# Patient Record
Sex: FEMALE | Race: BLACK OR AFRICAN AMERICAN | Employment: UNEMPLOYED | URBAN - METROPOLITAN AREA
[De-identification: names, ages, dates, MRNs, and addresses within clinical notes are randomized per-mention and may not be internally consistent; named-entity substitution may affect disease eponyms.]

---

## 2018-07-06 PROCEDURE — 87624 HPV HI-RISK TYP POOLED RSLT: CPT | Performed by: OBSTETRICS & GYNECOLOGY

## 2018-07-06 PROCEDURE — 88175 CYTOPATH C/V AUTO FLUID REDO: CPT | Performed by: OBSTETRICS & GYNECOLOGY

## 2021-04-01 ENCOUNTER — APPOINTMENT (OUTPATIENT)
Dept: GENERAL RADIOLOGY | Facility: HOSPITAL | Age: 41
End: 2021-04-01
Attending: EMERGENCY MEDICINE
Payer: MEDICAID

## 2021-04-01 ENCOUNTER — HOSPITAL ENCOUNTER (EMERGENCY)
Facility: HOSPITAL | Age: 41
Discharge: HOME | End: 2021-04-02
Attending: EMERGENCY MEDICINE
Payer: MEDICAID

## 2021-04-01 VITALS
OXYGEN SATURATION: 100 % | BODY MASS INDEX: 24.25 KG/M2 | HEART RATE: 94 BPM | TEMPERATURE: 98 F | DIASTOLIC BLOOD PRESSURE: 79 MMHG | RESPIRATION RATE: 18 BRPM | HEIGHT: 68 IN | WEIGHT: 160 LBS | SYSTOLIC BLOOD PRESSURE: 123 MMHG

## 2021-04-01 DIAGNOSIS — M77.41 METATARSALGIA OF RIGHT FOOT: Primary | ICD-10-CM

## 2021-04-01 PROCEDURE — 99283 EMERGENCY DEPT VISIT LOW MDM: CPT

## 2021-04-01 PROCEDURE — 73630 X-RAY EXAM OF FOOT: CPT | Performed by: EMERGENCY MEDICINE

## 2021-04-01 RX ORDER — IBUPROFEN 600 MG/1
600 TABLET ORAL ONCE
Status: COMPLETED | OUTPATIENT
Start: 2021-04-01 | End: 2021-04-01

## 2021-04-02 NOTE — ED PROVIDER NOTES
Patient Seen in: [facility] Emergency Department      History   Patient presents with:  Pain    Stated Complaint: foot pain after wearing high heels    HPI/Subjective:   HPI    37-year-old female complaint of right foot pain the patient states that swelling or deformity neurovascular is fully intact. ED Course   Labs Reviewed - No data to display       XR FOOT, COMPLETE (MIN 3 VIEWS), RIGHT (CPT=73630)    Result Date: 4/1/2021  CONCLUSION:  Negative.    Dictated by (CST): Charbel Candelaria MD on 4

## 2021-04-02 NOTE — ED INITIAL ASSESSMENT (HPI)
Pt was wearing high heels one week ago and now the sole of the foot and the top of the foot hurt. Some relief with massage but then pain returns when she walks.

## 2022-12-31 ENCOUNTER — HOSPITAL ENCOUNTER (EMERGENCY)
Facility: HOSPITAL | Age: 42
Discharge: HOME | End: 2022-12-31
Attending: EMERGENCY MEDICINE
Payer: MEDICAID

## 2022-12-31 ENCOUNTER — APPOINTMENT (OUTPATIENT)
Dept: CT IMAGING | Facility: HOSPITAL | Age: 42
End: 2022-12-31
Attending: EMERGENCY MEDICINE
Payer: MEDICAID

## 2022-12-31 VITALS
TEMPERATURE: 100 F | BODY MASS INDEX: 23.54 KG/M2 | RESPIRATION RATE: 16 BRPM | HEIGHT: 67 IN | SYSTOLIC BLOOD PRESSURE: 142 MMHG | HEART RATE: 72 BPM | DIASTOLIC BLOOD PRESSURE: 78 MMHG | OXYGEN SATURATION: 100 % | WEIGHT: 150 LBS

## 2022-12-31 DIAGNOSIS — R51.9 ACUTE NONINTRACTABLE HEADACHE, UNSPECIFIED HEADACHE TYPE: Primary | ICD-10-CM

## 2022-12-31 DIAGNOSIS — B34.9 VIRAL SYNDROME: ICD-10-CM

## 2022-12-31 LAB
ALBUMIN SERPL-MCNC: 3.9 G/DL (ref 3.4–5)
ALBUMIN/GLOB SERPL: 0.8 {RATIO} (ref 1–2)
ALP LIVER SERPL-CCNC: 46 U/L
ALT SERPL-CCNC: 13 U/L
ANION GAP SERPL CALC-SCNC: 5 MMOL/L (ref 0–18)
AST SERPL-CCNC: 9 U/L (ref 15–37)
B-HCG UR QL: NEGATIVE
BASOPHILS # BLD AUTO: 0.01 X10(3) UL (ref 0–0.2)
BASOPHILS NFR BLD AUTO: 0.1 %
BILIRUB SERPL-MCNC: 0.6 MG/DL (ref 0.1–2)
BILIRUB UR QL STRIP.AUTO: NEGATIVE
BUN BLD-MCNC: 6 MG/DL (ref 7–18)
CALCIUM BLD-MCNC: 9.3 MG/DL (ref 8.5–10.1)
CHLORIDE SERPL-SCNC: 104 MMOL/L (ref 98–112)
CLARITY UR REFRACT.AUTO: CLEAR
CO2 SERPL-SCNC: 25 MMOL/L (ref 21–32)
COLOR UR AUTO: YELLOW
CREAT BLD-MCNC: 0.78 MG/DL
EOSINOPHIL # BLD AUTO: 0 X10(3) UL (ref 0–0.7)
EOSINOPHIL NFR BLD AUTO: 0 %
ERYTHROCYTE [DISTWIDTH] IN BLOOD BY AUTOMATED COUNT: 13.3 %
FLUAV + FLUBV RNA SPEC NAA+PROBE: NEGATIVE
FLUAV + FLUBV RNA SPEC NAA+PROBE: NEGATIVE
GFR SERPLBLD BASED ON 1.73 SQ M-ARVRAT: 98 ML/MIN/1.73M2 (ref 60–?)
GLOBULIN PLAS-MCNC: 4.8 G/DL (ref 2.8–4.4)
GLUCOSE BLD-MCNC: 142 MG/DL (ref 70–99)
GLUCOSE UR STRIP.AUTO-MCNC: NEGATIVE MG/DL
HCT VFR BLD AUTO: 35.2 %
HGB BLD-MCNC: 12 G/DL
IMM GRANULOCYTES # BLD AUTO: 0.01 X10(3) UL (ref 0–1)
IMM GRANULOCYTES NFR BLD: 0.1 %
KETONES UR STRIP.AUTO-MCNC: 20 MG/DL
LEUKOCYTE ESTERASE UR QL STRIP.AUTO: NEGATIVE
LIPASE SERPL-CCNC: 76 U/L (ref 73–393)
LYMPHOCYTES # BLD AUTO: 0.57 X10(3) UL (ref 1–4)
LYMPHOCYTES NFR BLD AUTO: 7.8 %
MCH RBC QN AUTO: 28.8 PG (ref 26–34)
MCHC RBC AUTO-ENTMCNC: 34.1 G/DL (ref 31–37)
MCV RBC AUTO: 84.4 FL
MONOCYTES # BLD AUTO: 0.43 X10(3) UL (ref 0.1–1)
MONOCYTES NFR BLD AUTO: 5.9 %
NEUTROPHILS # BLD AUTO: 6.3 X10 (3) UL (ref 1.5–7.7)
NEUTROPHILS # BLD AUTO: 6.3 X10(3) UL (ref 1.5–7.7)
NEUTROPHILS NFR BLD AUTO: 86.1 %
NITRITE UR QL STRIP.AUTO: NEGATIVE
OSMOLALITY SERPL CALC.SUM OF ELEC: 278 MOSM/KG (ref 275–295)
PH UR STRIP.AUTO: 5 [PH] (ref 5–8)
PLATELET # BLD AUTO: 211 10(3)UL (ref 150–450)
POTASSIUM SERPL-SCNC: 3.7 MMOL/L (ref 3.5–5.1)
PROT SERPL-MCNC: 8.7 G/DL (ref 6.4–8.2)
PROT UR STRIP.AUTO-MCNC: 30 MG/DL
RBC # BLD AUTO: 4.17 X10(6)UL
RBC UR QL AUTO: NEGATIVE
RSV RNA SPEC NAA+PROBE: NEGATIVE
SARS-COV-2 RNA RESP QL NAA+PROBE: NOT DETECTED
SODIUM SERPL-SCNC: 134 MMOL/L (ref 136–145)
SP GR UR STRIP.AUTO: 1.01 (ref 1–1.03)
UROBILINOGEN UR STRIP.AUTO-MCNC: <2 MG/DL
WBC # BLD AUTO: 7.3 X10(3) UL (ref 4–11)

## 2022-12-31 PROCEDURE — 99284 EMERGENCY DEPT VISIT MOD MDM: CPT

## 2022-12-31 PROCEDURE — 70450 CT HEAD/BRAIN W/O DYE: CPT | Performed by: EMERGENCY MEDICINE

## 2022-12-31 PROCEDURE — 96374 THER/PROPH/DIAG INJ IV PUSH: CPT

## 2022-12-31 PROCEDURE — 81025 URINE PREGNANCY TEST: CPT

## 2022-12-31 PROCEDURE — 83690 ASSAY OF LIPASE: CPT

## 2022-12-31 PROCEDURE — 0241U SARS-COV-2/FLU A AND B/RSV BY PCR (GENEXPERT): CPT | Performed by: EMERGENCY MEDICINE

## 2022-12-31 PROCEDURE — 81001 URINALYSIS AUTO W/SCOPE: CPT

## 2022-12-31 PROCEDURE — 0241U SARS-COV-2/FLU A AND B/RSV BY PCR (GENEXPERT): CPT

## 2022-12-31 PROCEDURE — 80053 COMPREHEN METABOLIC PANEL: CPT

## 2022-12-31 PROCEDURE — 96361 HYDRATE IV INFUSION ADD-ON: CPT

## 2022-12-31 PROCEDURE — 85025 COMPLETE CBC W/AUTO DIFF WBC: CPT

## 2022-12-31 PROCEDURE — 96375 TX/PRO/DX INJ NEW DRUG ADDON: CPT

## 2022-12-31 RX ORDER — FLUTICASONE PROPIONATE 50 MCG
2 SPRAY, SUSPENSION (ML) NASAL DAILY
Qty: 16 G | Refills: 0 | Status: SHIPPED | OUTPATIENT
Start: 2022-12-31 | End: 2023-01-30

## 2022-12-31 RX ORDER — SODIUM CHLORIDE 9 MG/ML
INJECTION, SOLUTION INTRAVENOUS CONTINUOUS
Status: DISCONTINUED | OUTPATIENT
Start: 2022-12-31 | End: 2022-12-31

## 2022-12-31 RX ORDER — DIPHENHYDRAMINE HYDROCHLORIDE 50 MG/ML
25 INJECTION INTRAMUSCULAR; INTRAVENOUS ONCE
Status: COMPLETED | OUTPATIENT
Start: 2022-12-31 | End: 2022-12-31

## 2022-12-31 RX ORDER — ONDANSETRON 4 MG/1
4 TABLET, ORALLY DISINTEGRATING ORAL EVERY 4 HOURS PRN
Qty: 10 TABLET | Refills: 0 | Status: SHIPPED | OUTPATIENT
Start: 2022-12-31 | End: 2023-01-07

## 2022-12-31 RX ORDER — METOCLOPRAMIDE HYDROCHLORIDE 5 MG/ML
5 INJECTION INTRAMUSCULAR; INTRAVENOUS ONCE
Status: COMPLETED | OUTPATIENT
Start: 2022-12-31 | End: 2022-12-31

## 2022-12-31 RX ORDER — ONDANSETRON 4 MG/1
4 TABLET, ORALLY DISINTEGRATING ORAL EVERY 4 HOURS PRN
Qty: 10 TABLET | Refills: 0 | Status: SHIPPED | OUTPATIENT
Start: 2022-12-31 | End: 2022-12-31

## 2022-12-31 RX ORDER — KETOROLAC TROMETHAMINE 15 MG/ML
15 INJECTION, SOLUTION INTRAMUSCULAR; INTRAVENOUS ONCE
Status: COMPLETED | OUTPATIENT
Start: 2022-12-31 | End: 2022-12-31

## 2022-12-31 NOTE — ED INITIAL ASSESSMENT (HPI)
Patient arrives to the ED with c/o Abd pain, body aches, headaches, pressure/ sharp pain in the head. Symptoms started after she drank from a \"dirty water bottle\". Patient also had episodes of vomiting.

## 2023-01-01 NOTE — DISCHARGE INSTRUCTIONS
Return for new or worsening symptoms such as increased headache, further vomiting, fever, weakness. Without a spinal tap we cannot rule out meningitis which is a potentially serious infection. If you change your mind about further testing you can return to the ER at any time. You can take acetaminophen 500 mg and ibuprofen 200 mg together every 4-6 hours for pain as needed.

## 2024-03-25 ENCOUNTER — OFFICE VISIT (OUTPATIENT)
Dept: INTERNAL MEDICINE CLINIC | Facility: CLINIC | Age: 44
End: 2024-03-25
Payer: COMMERCIAL

## 2024-03-25 VITALS
BODY MASS INDEX: 24.72 KG/M2 | HEART RATE: 105 BPM | HEIGHT: 68.5 IN | SYSTOLIC BLOOD PRESSURE: 100 MMHG | DIASTOLIC BLOOD PRESSURE: 62 MMHG | OXYGEN SATURATION: 99 % | WEIGHT: 165 LBS | TEMPERATURE: 98 F

## 2024-03-25 DIAGNOSIS — M79.671 FOOT PAIN, BILATERAL: ICD-10-CM

## 2024-03-25 DIAGNOSIS — M79.672 FOOT PAIN, BILATERAL: ICD-10-CM

## 2024-03-25 DIAGNOSIS — M72.2 PLANTAR FASCIITIS, BILATERAL: ICD-10-CM

## 2024-03-25 DIAGNOSIS — Z12.4 PAP SMEAR FOR CERVICAL CANCER SCREENING: ICD-10-CM

## 2024-03-25 DIAGNOSIS — Z76.89 ESTABLISHING CARE WITH NEW DOCTOR, ENCOUNTER FOR: Primary | ICD-10-CM

## 2024-03-25 DIAGNOSIS — Z12.31 SCREENING MAMMOGRAM FOR BREAST CANCER: ICD-10-CM

## 2024-03-25 NOTE — PROGRESS NOTES
[facility]  New Patient History and Physical      HPI:     Chief Complaint   Patient presents with    Establish [facility]       Jaylen Mccall is a 43 year old female presenting for:  Establishment of care.  Has  has a past medical history of Sickle cell trait (HCC).      Here to establish care.  Reports hx of sickle cell trait.  Works as a .      Reports bilateral foot pain.  Has it frequently.              Labs:   Complete Metabolic Panel:  Lab Results   Component Value Date/Time     (L) 12/31/2022 07:12 PM    K 3.7 12/31/2022 07:12 PM     12/31/2022 07:12 PM    CO2 25.0 12/31/2022 07:12 PM    CREATSERUM 0.78 12/31/2022 07:12 PM    CA 9.3 12/31/2022 07:12 PM     (H) 12/31/2022 07:12 PM    TP 8.7 (H) 12/31/2022 07:12 PM    ALB 3.9 12/31/2022 07:12 PM    ALKPHO 46 12/31/2022 07:12 PM    AST 9 (L) 12/31/2022 07:12 PM    ALT 13 12/31/2022 07:12 PM    BILT 0.6 12/31/2022 07:12 PM        CBC:  @LABRCNTIP(RBC:3,HGB:3,HCT:3,MCV:3,MCH:3,MCHC:3,RDW:3,NEPRELIM:3,WBC:3,PLT:3)@       Hemoglobin A1C, Microalbumin  No results found for: \"A1C\"     Lipid panel  No results found for: \"CHOLEST\", \"HDL\", \"TRIG\", \"LDL\", \"NONHDLC\"  The ASCVD Risk score (Marly DK, et al., 2019) failed to calculate for the following reasons:    Cannot find a previous HDL lab    Cannot find a previous total cholesterol lab       Medications:  Current Outpatient Medications   Medication Sig Dispense Refill    Multiple Vitamin (MULTI-VITAMIN DAILY) Oral Tab Take by mouth.        PMH:  Past Medical History:   Diagnosis Date    Sickle cell anemia (HCC)          PSH:  Past Surgical History:   Procedure Laterality Date    LEEP  2005       Allergies:  Allergies   Allergen Reactions    Sulfamethoxazole W/Trimethoprim RASH      Social History:  Social History     Socioeconomic History    Marital status: Single   Tobacco Use    Smoking status: Former     Types: Cigarettes     Quit date: 2003     Years since  quittin.2    Smokeless tobacco: Never   Vaping Use    Vaping Use: Never used   Substance and Sexual Activity    Alcohol use: Yes     Comment: occasionally    Drug use: No    Sexual activity: Not Currently     Partners: Male     Comment: 2008        Family History:  Family History   Problem Relation Age of Onset    No Known Problems Father     No Known Problems Mother     Cancer Paternal Grandmother         unknown    Diabetes Paternal Aunt     Diabetes Maternal Uncle     Diabetes Paternal Uncle           REVIEW OF SYSTEMS:   Review of Systems   Constitutional:  Negative for chills, fatigue, fever and unexpected weight change.   HENT:  Negative for congestion, ear pain, hearing loss, rhinorrhea, sinus pain and sore throat.    Eyes:  Negative for pain, redness and visual disturbance.   Respiratory:  Negative for apnea, cough, chest tightness, shortness of breath and wheezing.    Cardiovascular:  Negative for chest pain, palpitations and leg swelling.   Gastrointestinal:  Negative for abdominal distention, abdominal pain, blood in stool, constipation and nausea.   Endocrine: Negative for cold intolerance, heat intolerance and polyuria.   Genitourinary:  Negative for dysuria, hematuria and urgency.   Musculoskeletal:  Negative for arthralgias, back pain, gait problem, joint swelling, myalgias and neck pain.        Foot pain bilaterally.     Skin:  Negative for rash and wound.   Allergic/Immunologic: Negative for food allergies and immunocompromised state.   Neurological:  Negative for dizziness, seizures, facial asymmetry, speech difficulty, weakness, light-headedness, numbness and headaches.   Hematological:  Negative for adenopathy. Does not bruise/bleed easily.   Psychiatric/Behavioral:  Negative for behavioral problems, sleep disturbance and suicidal ideas. The patient is not nervous/anxious.             PHYSICAL EXAM:   /62 (BP Location: Right arm, Patient Position: Sitting, Cuff Size: adult)   Pulse  105   Temp 98.1 °F (36.7 °C) (Temporal)   Ht 5' 8.5\" (1.74 m)   Wt 165 lb (74.8 kg)   LMP 03/22/2024   SpO2 99%   BMI 24.72 kg/m²  Estimated body mass index is 24.72 kg/m² as calculated from the following:    Height as of this encounter: 5' 8.5\" (1.74 m).    Weight as of this encounter: 165 lb (74.8 kg).     Wt Readings from Last 3 Encounters:   03/25/24 165 lb (74.8 kg)   12/31/22 150 lb (68 kg)   04/01/21 160 lb (72.6 kg)       Physical Exam  Vitals reviewed.   Constitutional:       General: She is not in acute distress.     Appearance: She is well-developed.   HENT:      Head: Normocephalic and atraumatic.   Eyes:      Conjunctiva/sclera: Conjunctivae normal.      Pupils: Pupils are equal, round, and reactive to light.   Neck:      Thyroid: No thyromegaly.   Cardiovascular:      Rate and Rhythm: Normal rate and regular rhythm.      Heart sounds: Normal heart sounds, S1 normal and S2 normal. No murmur heard.     No friction rub. No gallop.   Pulmonary:      Effort: Pulmonary effort is normal. No respiratory distress.      Breath sounds: Normal breath sounds. No wheezing or rales.   Chest:      Chest wall: No tenderness.   Abdominal:      General: Bowel sounds are normal. There is no distension.      Palpations: Abdomen is soft. There is no mass.      Tenderness: There is no abdominal tenderness. There is no guarding or rebound.   Musculoskeletal:         General: No tenderness. Normal range of motion.      Cervical back: Normal range of motion.   Lymphadenopathy:      Cervical: No cervical adenopathy.   Skin:     General: Skin is warm.      Findings: No erythema or rash.   Neurological:      Mental Status: She is alert and oriented to person, place, and time.      Cranial Nerves: No cranial nerve deficit.      Deep Tendon Reflexes: Reflexes are normal and symmetric.   Psychiatric:         Behavior: Behavior normal.         Thought Content: Thought content normal.         Judgment: Judgment normal.              ASSESSMENT AND PLAN:   Patient is a 43 year old female who presents primarily presents for:    (Z76.89) Establishing care with new doctor, encounter for  (primary encounter diagnosis)  Plan: CBC With Differential With Platelet, Comp         Metabolic Panel (14), Hemoglobin A1C, Lipid         Panel            (Z12.31) Screening mammogram for breast cancer  Plan: [facility] SINDY 2D+3D SCREENING BILAT         (CPT=77067/58349)            (Z12.4) Pap smear for cervical cancer screening  Plan: OBG Referral - In Network            (M79.671,  M79.672) Foot pain, bilateral  Comment: plantar side.  Plan: Discussed possible cause of plantar fascitis.      (M72.2) Plantar fasciitis, bilateral  Plan: Diclofenac gel recommended.               Health Maintenance:  Health Maintenance   Topic Date Due    Annual Physical  Never done    Mammogram  Never done    DTaP,Tdap,and Td Vaccines (1 - Tdap) Never done    Pap Smear  07/06/2021    COVID-19 Vaccine (1 - 2023-24 season) Never done    Influenza Vaccine (1) 10/01/2023    Annual Depression Screening  Never done    Pneumococcal Vaccine: Birth to 64yrs  Aged Out             Meds & Refills for this Visit:  Requested Prescriptions      No prescriptions requested or ordered in this encounter       No orders of the defined types were placed in this encounter.      Imaging & Consults:  None        Ady Calixto MD     No follow-ups on file.  Important issues to follow up on next visit      Patient indicates understanding of the above recommendations and agrees to the above plan.  Reasurrance and education provided. All questions answered.  Notified to call with any questions, complications, allergies, or worsening or changing symptoms as well as any side effects or complications from the treatments .  Red flags/ ER precautions discussed.    This note was produced using voice recognition software.  As a result, errors may occur.  When identified, these errors have been corrected.   While every attempt is made to correct errors during dictation, errors may still exist.    Total time spent was 47 minutes which includes: Preparation to see patient including chart review, reviewing appropriate medical history, counseling and education (diet and exercise), discussing treatment options, ordering appropriate diagnostic tests and documentation.    Ady Calixto MD  Internal Medicine/Primary Care  [facility]

## 2025-03-29 NOTE — ED INITIAL ASSESSMENT (HPI)
Patient to the ED for compliant of cough that started on Tuesday. Pt states she also notices some blood when blowing her nose but not having nose bleeds. PT is her with her mom. Pt on antibiotics that were prescribes yesterday.

## 2025-03-29 NOTE — ED PROVIDER NOTES
Patient Seen in: [facility] Emergency Department    History     Chief Complaint   Patient presents with    Cough       HPI    44-year-old female presents ER for complaint of cough and congestion.  Patient states she has really been taking amoxicillin twice a day for the last 2 days.  Patient concerned because he has postnasal drip and feels congested.  Patient denies any fever or chills.    History reviewed.   Past Medical History:    Sickle cell trait       History reviewed.   Past Surgical History:   Procedure Laterality Date    Leep           Medications :  Prescriptions Prior to Admission[1]     Family History   Problem Relation Age of Onset    No Known Problems Father     No Known Problems Mother     Cancer Paternal Grandmother         unknown    Diabetes Paternal Aunt     Diabetes Maternal Uncle     Diabetes Paternal Uncle        Smoking Status:   Social History     Socioeconomic History    Marital status: Single   Tobacco Use    Smoking status: Former     Current packs/day: 0.00     Types: Cigarettes     Quit date:      Years since quittin.2    Smokeless tobacco: Never   Vaping Use    Vaping status: Never Used   Substance and Sexual Activity    Alcohol use: Yes     Comment: occasionally    Drug use: No    Sexual activity: Not Currently     Partners: Male     Comment: 2008       ROS  All pertinent positives for the review of systems are mentioned in the HPI  All other organ systems are reviewed and are negative.    Constitutional and vital signs reviewed.      Social History and Family History elements reviewed from today, pertinent positives to the presenting problem noted.    Physical Exam     ED Triage Vitals [25 0529]   /69   Pulse 118   Resp 18   Temp 99.9 °F (37.7 °C)   Temp src Oral   SpO2 97 %   O2 Device None (Room air)       All measures to prevent infection transmission during my interaction with the patient were taken. The patient was already wearing a droplet mask  on my arrival to the room. Personal protective equipment including droplet mask, eye protection, and gloves were worn throughout the duration of the exam.  Handwashing was performed prior to and after the exam.  Stethoscope and any equipment used during my examination was cleaned with super sani-cloth germicidal wipes following the exam.     Physical Exam  Vitals and nursing note reviewed.   Constitutional:       Appearance: She is well-developed.   HENT:      Head: Normocephalic and atraumatic.      Right Ear: External ear normal.      Left Ear: External ear normal.      Nose: Congestion and rhinorrhea present.      Mouth/Throat:      Pharynx: No oropharyngeal exudate or posterior oropharyngeal erythema.   Eyes:      Conjunctiva/sclera: Conjunctivae normal.      Pupils: Pupils are equal, round, and reactive to light.   Cardiovascular:      Rate and Rhythm: Normal rate and regular rhythm.      Heart sounds: Normal heart sounds.   Pulmonary:      Effort: Pulmonary effort is normal.      Breath sounds: Normal breath sounds.   Abdominal:      General: Bowel sounds are normal.      Palpations: Abdomen is soft.   Musculoskeletal:         General: Normal range of motion.      Cervical back: Normal range of motion and neck supple.   Skin:     General: Skin is warm and dry.   Neurological:      Mental Status: She is alert and oriented to person, place, and time.      Deep Tendon Reflexes: Reflexes are normal and symmetric.   Psychiatric:         Behavior: Behavior normal.         Thought Content: Thought content normal.         Judgment: Judgment normal.         ED Course      Labs Reviewed - No data to display      Imaging Results Available and Reviewed while in ED: No results found.  ED Medications Administered: Medications - No data to display      MDM     Vitals:    03/29/25 0529   BP: 114/69   Pulse: 118   Resp: 18   Temp: 99.9 °F (37.7 °C)   TempSrc: Oral   SpO2: 97%   Weight: 73 kg   Height: 172.7 cm (5' 8\")     *I  personally reviewed and interpreted all ED vitals.  I also personally reviewed all labs and imaging if ordered    Pulse Ox: 97%, Room air, Normal     Monitor Interpretation:   normal sinus rhythm    Differential Diagnosis/ Diagnostic Considerations: Sinusitis, viral syndrome, URI    Medical Record Review: I personally reviewed available prior medical records for any recent pertinent discharge summaries, testing, and procedures and reviewed those reports.    Complicating Factors: The patient already has does not have a problem list on file. to contribute to the complexity of this ED evaluation.    Medical Decision Making  44-year-old female presents ER with complaint of sinus congestion and postnasal drip.  Patient already on amoxicillin twice a day for the next 7 days.  Patient given prescription for Sudafed to help with her sinus congestion and postnasal drip.  Patient's mother bedside made aware of the discharge planning disposition.    Problems Addressed:  Acute maxillary sinusitis, recurrence not specified: acute illness or injury  Post-nasal drip: acute illness or injury    Amount and/or Complexity of Data Reviewed  Independent Historian: parent     Details: Medical history obtained from the mother states that the patient has sinus congestion and cough with postnasal drip at night.    Risk  Prescription drug management.        Condition upon leaving the department: Stable    Disposition and Plan     Clinical Impression:  1. Acute maxillary sinusitis, recurrence not specified    2. Post-nasal drip        Disposition:  Discharge    Follow-up:  Ady Calixto MD  [address] [id number] [phone]    Schedule an appointment as soon as possible for a visit  If symptoms worsen      Medications Prescribed:  Current Discharge Medication List        START taking these medications    Details   pseudoephedrine 30 MG Oral Tab Take 1 tablet (30 mg total) by mouth every 4 (four) hours as  needed for congestion.  Qty: 20 tablet, Refills: 0    Associated Diagnoses: Acute maxillary sinusitis, recurrence not specified                    [1] (Not in a hospital admission)

## 2025-04-02 NOTE — PROGRESS NOTES
ED Hospital Follow up for pcp (Discharge 3/29 elm)       PCP   Ady Calixto   [facility]  [address]  [address]  [place] [address]  [phone]  Attempt #1:  Left message on voicemail for patient to call transitions specialist back to schedule follow up appointments. Provided Transitions specialist scheduling phone number [phone].

## 2025-04-03 NOTE — PROGRESS NOTES
ED Hospital Follow up for pcp (Discharge 3/29 elm)         PCP   Ady Calixto   [facility]  [address]  [address]  [place] [address]  [phone]  Wednesday 7/23 @10:40am w/ Dr. Ady Calixto (existing appt) / Unable to reach pt after 2nd attempt        Attempt #2:  Left message on voicemail for patient to call transitions specialist back to schedule follow up appointments. Provided Transitions specialist scheduling phone number [phone]. Closing encounter. Will re-open if patient returns call.